# Patient Record
Sex: MALE | Race: WHITE | URBAN - NONMETROPOLITAN AREA
[De-identification: names, ages, dates, MRNs, and addresses within clinical notes are randomized per-mention and may not be internally consistent; named-entity substitution may affect disease eponyms.]

---

## 2022-07-14 ENCOUNTER — OFFICE VISIT (OUTPATIENT)
Dept: URBAN - NONMETROPOLITAN AREA CLINIC 6 | Facility: CLINIC | Age: 22
End: 2022-07-14
Payer: COMMERCIAL

## 2022-07-14 DIAGNOSIS — T15.01XA FOREIGN BODY IN CORNEA, RIGHT EYE: Primary | ICD-10-CM

## 2022-07-14 PROCEDURE — 65205 REMOVE FOREIGN BODY FROM EYE: CPT | Performed by: OPTOMETRIST

## 2022-07-14 NOTE — IMPRESSION/PLAN
Impression: Foreign body in cornea, right eye: T15.01xA. Plan: FB rust ring present. Discussed findings with patient. Removed 50% at slit lamp. 1 gtt Ofloxacin and 1 gtt Cyclo put in OD in office. BCL placed on OD. Continue with Sulfacetamide Sodium gtts QID OD. Return to clinic in 4 days for follow up, sooner if symptoms worsen. [employer] N[employer]D 
-0.25 [facility] Lot# [id number] exp 8/31/26.

## 2022-07-18 ENCOUNTER — OFFICE VISIT (OUTPATIENT)
Dept: URBAN - NONMETROPOLITAN AREA CLINIC 6 | Facility: CLINIC | Age: 22
End: 2022-07-18
Payer: COMMERCIAL

## 2022-07-18 DIAGNOSIS — T15.01XA FOREIGN BODY IN CORNEA, RIGHT EYE: Primary | ICD-10-CM

## 2022-07-18 PROCEDURE — 99212 OFFICE O/P EST SF 10 MIN: CPT | Performed by: OPTOMETRIST

## 2022-07-18 NOTE — IMPRESSION/PLAN
Impression: Foreign body in cornea, right eye: T15.01xA. Plan: Removed BCL in office without complications. Use Artificial tears prn-4 times a day. Refresh sample dispensed today. returns if symptoms reoccur.